# Patient Record
Sex: MALE | Race: WHITE | NOT HISPANIC OR LATINO
[De-identification: names, ages, dates, MRNs, and addresses within clinical notes are randomized per-mention and may not be internally consistent; named-entity substitution may affect disease eponyms.]

---

## 2019-10-29 ENCOUNTER — RESULT REVIEW (OUTPATIENT)
Age: 59
End: 2019-10-29

## 2019-11-05 ENCOUNTER — RESULT REVIEW (OUTPATIENT)
Age: 59
End: 2019-11-05

## 2019-11-12 ENCOUNTER — RESULT REVIEW (OUTPATIENT)
Age: 59
End: 2019-11-12

## 2020-12-10 ENCOUNTER — APPOINTMENT (OUTPATIENT)
Dept: CARDIOLOGY | Facility: CLINIC | Age: 60
End: 2020-12-10
Payer: COMMERCIAL

## 2020-12-10 ENCOUNTER — APPOINTMENT (OUTPATIENT)
Dept: CARDIOLOGY | Facility: CLINIC | Age: 60
End: 2020-12-10

## 2020-12-10 VITALS
SYSTOLIC BLOOD PRESSURE: 130 MMHG | TEMPERATURE: 97.7 F | DIASTOLIC BLOOD PRESSURE: 80 MMHG | HEART RATE: 99 BPM | BODY MASS INDEX: 35 KG/M2 | HEIGHT: 71 IN | WEIGHT: 250 LBS | OXYGEN SATURATION: 97 %

## 2020-12-10 PROCEDURE — 93000 ELECTROCARDIOGRAM COMPLETE: CPT

## 2020-12-10 PROCEDURE — 93306 TTE W/DOPPLER COMPLETE: CPT

## 2020-12-10 PROCEDURE — 99245 OFF/OP CONSLTJ NEW/EST HI 55: CPT

## 2020-12-10 PROCEDURE — 99072 ADDL SUPL MATRL&STAF TM PHE: CPT

## 2020-12-10 RX ORDER — SAXAGLIPTIN 5 MG/1
5 TABLET, FILM COATED ORAL DAILY
Refills: 0 | Status: ACTIVE | COMMUNITY

## 2020-12-10 RX ORDER — GLIMEPIRIDE 2 MG/1
2 TABLET ORAL
Refills: 0 | Status: ACTIVE | COMMUNITY

## 2020-12-10 RX ORDER — ATORVASTATIN CALCIUM 40 MG/1
40 TABLET, FILM COATED ORAL
Refills: 0 | Status: ACTIVE | COMMUNITY

## 2020-12-10 RX ORDER — CLOPIDOGREL 75 MG/1
75 TABLET, FILM COATED ORAL DAILY
Refills: 0 | Status: ACTIVE | COMMUNITY

## 2020-12-10 NOTE — REASON FOR VISIT
[Initial Evaluation] : an initial evaluation of [Cardiomyopathy] : cardiomyopathy [Coronary Artery Disease] : coronary artery disease [Hyperlipidemia] : hyperlipidemia [Hypertension] : hypertension [FreeTextEntry1] : 59-year-old white male here to reestablish cardiology follow-up after a very long hiatus.\par \par Known history of atherosclerotic heart disease, status post myocardial infarction in 2006 with ischemic cardiomyopathy, 2 stents placed to RCA and ICD placed.  Patient also has known hypertension and hyperlipidemia, mild obesity, sedentary lifestyle.  Patient has not had cardiology evaluation in several years but is followed at the EP center at [facility].\par \par He denies any recent chest pain, shortness of breath, palpitations, edema or syncopal episodes.  His medications have been relatively unchanged all of these years.  His diabetes is managed by his primary care physician and his last known A1c was 6.5, as per patient.  Denies history of renal insufficiency, neuropathy or any other sequelae of diabetes.  Nondrinker non-smoker.  Self-employed.\par \par Scheduled to have colonoscopy soon at [place] with Dr. Boo, he was referred because of EKG abnormalities that appeared new to the primary care physician.

## 2020-12-10 NOTE — PHYSICAL EXAM
[General Appearance - Well Developed] : well developed [Normal Appearance] : normal appearance [Well Groomed] : well groomed [General Appearance - Well Nourished] : well nourished [No Deformities] : no deformities [General Appearance - In No Acute Distress] : no acute distress [Normal Conjunctiva] : the conjunctiva exhibited no abnormalities [Eyelids - No Xanthelasma] : the eyelids demonstrated no xanthelasmas [Normal Oral Mucosa] : normal oral mucosa [No Oral Pallor] : no oral pallor [No Oral Cyanosis] : no oral cyanosis [Normal Jugular Venous A Waves Present] : normal jugular venous A waves present [Normal Jugular Venous V Waves Present] : normal jugular venous V waves present [No Jugular Venous Bella A Waves] : no jugular venous bella A waves [Heart Rate And Rhythm] : heart rate and rhythm were normal [Heart Sounds] : normal S1 and S2 [Murmurs] : no murmurs present [Respiration, Rhythm And Depth] : normal respiratory rhythm and effort [Exaggerated Use Of Accessory Muscles For Inspiration] : no accessory muscle use [Auscultation Breath Sounds / Voice Sounds] : lungs were clear to auscultation bilaterally [Abdomen Soft] : soft [Abdomen Tenderness] : non-tender [Abdomen Mass (___ Cm)] : no abdominal mass palpated [Abnormal Walk] : normal gait [Gait - Sufficient For Exercise Testing] : the gait was sufficient for exercise testing [Nail Clubbing] : no clubbing of the fingernails [Cyanosis, Localized] : no localized cyanosis [Petechial Hemorrhages (___cm)] : no petechial hemorrhages [Skin Color & Pigmentation] : normal skin color and pigmentation [] : no rash [No Venous Stasis] : no venous stasis [Skin Lesions] : no skin lesions [No Skin Ulcers] : no skin ulcer [No Xanthoma] : no  xanthoma was observed [Oriented To Time, Place, And Person] : oriented to person, place, and time [Affect] : the affect was normal [Mood] : the mood was normal [No Anxiety] : not feeling anxious

## 2020-12-10 NOTE — ASSESSMENT
[FreeTextEntry1] : 59-year-old male with scheduled colonoscopy.  Here for cardiac evaluation and clearance.  No recent cardiac evaluation done.  Patient denies any recent chest pain or shortness of breath.  Will obtain echocardiogram to reevaluate left ventricular function.  Would consider changing Diovan HCT to Entresto if EF is reduced and also consider addition of Aldactone.  Requested copies of most recent labs to assess renal function and electrolytes.\par Patient has not had an ischemia evaluation in over a decade; patient scheduled for pharmacological nuclear stress testing.  Final clearance for colonoscopy pending above.

## 2020-12-15 ENCOUNTER — APPOINTMENT (OUTPATIENT)
Dept: CARDIOLOGY | Facility: CLINIC | Age: 60
End: 2020-12-15
Payer: COMMERCIAL

## 2020-12-15 ENCOUNTER — MED ADMIN CHARGE (OUTPATIENT)
Age: 60
End: 2020-12-15

## 2020-12-15 PROCEDURE — 99072 ADDL SUPL MATRL&STAF TM PHE: CPT

## 2020-12-15 PROCEDURE — 78452 HT MUSCLE IMAGE SPECT MULT: CPT

## 2020-12-15 PROCEDURE — A9500: CPT

## 2020-12-15 PROCEDURE — 93015 CV STRESS TEST SUPVJ I&R: CPT

## 2020-12-15 RX ORDER — REGADENOSON 0.08 MG/ML
0.4 INJECTION, SOLUTION INTRAVENOUS
Qty: 1 | Refills: 0 | Status: COMPLETED | OUTPATIENT
Start: 2020-12-15

## 2020-12-15 RX ADMIN — REGADENOSON 4 MG/5ML: 0.08 INJECTION, SOLUTION INTRAVENOUS at 00:00

## 2021-01-06 ENCOUNTER — APPOINTMENT (OUTPATIENT)
Dept: CARDIOLOGY | Facility: CLINIC | Age: 61
End: 2021-01-06
Payer: COMMERCIAL

## 2021-01-06 VITALS
WEIGHT: 256 LBS | TEMPERATURE: 97.7 F | SYSTOLIC BLOOD PRESSURE: 128 MMHG | OXYGEN SATURATION: 98 % | DIASTOLIC BLOOD PRESSURE: 80 MMHG | HEIGHT: 71 IN | BODY MASS INDEX: 35.84 KG/M2 | HEART RATE: 72 BPM

## 2021-01-06 PROCEDURE — 99214 OFFICE O/P EST MOD 30 MIN: CPT

## 2021-01-06 PROCEDURE — 99072 ADDL SUPL MATRL&STAF TM PHE: CPT

## 2021-01-06 PROCEDURE — 93000 ELECTROCARDIOGRAM COMPLETE: CPT

## 2021-01-06 PROCEDURE — 99244 OFF/OP CNSLTJ NEW/EST MOD 40: CPT

## 2021-01-06 RX ORDER — VALSARTAN AND HYDROCHLOROTHIAZIDE 80; 12.5 MG/1; MG/1
80-12.5 TABLET, FILM COATED ORAL
Refills: 0 | Status: DISCONTINUED | COMMUNITY
End: 2021-01-06

## 2021-01-06 NOTE — REASON FOR VISIT
[Initial Evaluation] : an initial evaluation of [Cardiomyopathy] : cardiomyopathy [Coronary Artery Disease] : coronary artery disease [Hyperlipidemia] : hyperlipidemia [Hypertension] : hypertension [FreeTextEntry1] : 60-year-old white male with a known history of atherosclerotic heart disease, status post myocardial infarction in 2006 with ischemic cardiomyopathy, 2 stents placed to RCA and ICD placed.  H/O hypertension and hyperlipidemia, mild obesity, sedentary lifestyle.  Patient is followed at the EP center at [facility].\par \par He denies any recent chest pain, shortness of breath, palpitations, edema or syncopal episodes.  His medications have been relatively unchanged all of these years.  His diabetes is managed by his primary care physician and his last known A1c was 6.5, as per patient.  Denies history of renal insufficiency, neuropathy or any other sequelae of diabetes.  Nondrinker non-smoker.  Self-employed.\par \par Here to review results of recent cardiac testing.\par \par Scheduled to have colonoscopy soon at [place] with Dr. Boo, he was referred because of EKG abnormalities that appeared new to the primary care physician.

## 2021-01-06 NOTE — ASSESSMENT
[FreeTextEntry1] : 59-year-old male for scheduled colonoscopy.  \par No cardiac contraindication to colonoscopy (low risk procedure). Patient on DAPT, may hold Plavix and ASA up to 5 days prior to colonoscopy if requested by gastroenterologist.\par \par Will change Diovan HCT to Entresto and consider addition of Aldactone at next visit. \par \par Lengthy d/w patient regarding his cardiomyopathy (EF was in 30's prior to this). Stress test demonstrates scar tissue, not pursuing further interventions.

## 2021-01-06 NOTE — PHYSICAL EXAM
[General Appearance - Well Developed] : well developed [Normal Appearance] : normal appearance [Well Groomed] : well groomed [General Appearance - Well Nourished] : well nourished [No Deformities] : no deformities [General Appearance - In No Acute Distress] : no acute distress [Normal Conjunctiva] : the conjunctiva exhibited no abnormalities [Eyelids - No Xanthelasma] : the eyelids demonstrated no xanthelasmas [Normal Oral Mucosa] : normal oral mucosa [No Oral Pallor] : no oral pallor [No Oral Cyanosis] : no oral cyanosis [Normal Jugular Venous A Waves Present] : normal jugular venous A waves present [Normal Jugular Venous V Waves Present] : normal jugular venous V waves present [No Jugular Venous Bella A Waves] : no jugular venous bella A waves [Respiration, Rhythm And Depth] : normal respiratory rhythm and effort [Exaggerated Use Of Accessory Muscles For Inspiration] : no accessory muscle use [Auscultation Breath Sounds / Voice Sounds] : lungs were clear to auscultation bilaterally [Heart Rate And Rhythm] : heart rate and rhythm were normal [Heart Sounds] : normal S1 and S2 [Murmurs] : no murmurs present [Abdomen Soft] : soft [Abdomen Tenderness] : non-tender [Abdomen Mass (___ Cm)] : no abdominal mass palpated [Abnormal Walk] : normal gait [Gait - Sufficient For Exercise Testing] : the gait was sufficient for exercise testing [Nail Clubbing] : no clubbing of the fingernails [Cyanosis, Localized] : no localized cyanosis [Petechial Hemorrhages (___cm)] : no petechial hemorrhages [Skin Color & Pigmentation] : normal skin color and pigmentation [] : no rash [No Venous Stasis] : no venous stasis [Skin Lesions] : no skin lesions [No Skin Ulcers] : no skin ulcer [No Xanthoma] : no  xanthoma was observed [Oriented To Time, Place, And Person] : oriented to person, place, and time [Affect] : the affect was normal [Mood] : the mood was normal [No Anxiety] : not feeling anxious

## 2021-02-18 ENCOUNTER — APPOINTMENT (OUTPATIENT)
Dept: CARDIOLOGY | Facility: CLINIC | Age: 61
End: 2021-02-18

## 2021-02-22 ENCOUNTER — APPOINTMENT (OUTPATIENT)
Dept: CARDIOLOGY | Facility: CLINIC | Age: 61
End: 2021-02-22
Payer: COMMERCIAL

## 2021-02-22 VITALS
BODY MASS INDEX: 34.58 KG/M2 | HEART RATE: 92 BPM | DIASTOLIC BLOOD PRESSURE: 70 MMHG | OXYGEN SATURATION: 98 % | TEMPERATURE: 98.6 F | HEIGHT: 71 IN | WEIGHT: 247 LBS | SYSTOLIC BLOOD PRESSURE: 120 MMHG

## 2021-02-22 DIAGNOSIS — Z20.822 CONTACT WITH AND (SUSPECTED) EXPOSURE TO COVID-19: ICD-10-CM

## 2021-02-22 PROCEDURE — 99072 ADDL SUPL MATRL&STAF TM PHE: CPT

## 2021-02-22 PROCEDURE — 99214 OFFICE O/P EST MOD 30 MIN: CPT

## 2021-02-24 NOTE — PHYSICAL EXAM
[General Appearance - Well Developed] : well developed [Normal Appearance] : normal appearance [Well Groomed] : well groomed [General Appearance - Well Nourished] : well nourished [No Deformities] : no deformities [General Appearance - In No Acute Distress] : no acute distress [Normal Conjunctiva] : the conjunctiva exhibited no abnormalities [Eyelids - No Xanthelasma] : the eyelids demonstrated no xanthelasmas [Normal Oral Mucosa] : normal oral mucosa [No Oral Pallor] : no oral pallor [No Oral Cyanosis] : no oral cyanosis [Normal Jugular Venous A Waves Present] : normal jugular venous A waves present [Normal Jugular Venous V Waves Present] : normal jugular venous V waves present [No Jugular Venous Bella A Waves] : no jugular venous bella A waves [Respiration, Rhythm And Depth] : normal respiratory rhythm and effort [Exaggerated Use Of Accessory Muscles For Inspiration] : no accessory muscle use [Auscultation Breath Sounds / Voice Sounds] : lungs were clear to auscultation bilaterally [Heart Rate And Rhythm] : heart rate and rhythm were normal [Heart Sounds] : normal S1 and S2 [Murmurs] : no murmurs present [Abdomen Soft] : soft [Abdomen Tenderness] : non-tender [Abdomen Mass (___ Cm)] : no abdominal mass palpated [Abnormal Walk] : normal gait [Gait - Sufficient For Exercise Testing] : the gait was sufficient for exercise testing [Nail Clubbing] : no clubbing of the fingernails [Cyanosis, Localized] : no localized cyanosis [Petechial Hemorrhages (___cm)] : no petechial hemorrhages [] : no rash [Skin Color & Pigmentation] : normal skin color and pigmentation [No Venous Stasis] : no venous stasis [Skin Lesions] : no skin lesions [No Skin Ulcers] : no skin ulcer [No Xanthoma] : no  xanthoma was observed [Oriented To Time, Place, And Person] : oriented to person, place, and time [Affect] : the affect was normal [Mood] : the mood was normal [No Anxiety] : not feeling anxious

## 2021-02-24 NOTE — DISCUSSION/SUMMARY
[FreeTextEntry1] : 59-year-old male for scheduled colonoscopy.  \par No cardiac contraindication to colonoscopy (low risk procedure). Patient on DAPT, may hold Plavix and ASA up to 5 days prior to colonoscopy if requested by gastroenterologist.\par \par Tolerating Entresto; if labs drawn show no contraindications, will add Aldactone. \par \par Lengthy d/w patient regarding his cardiomyopathy (EF was in 30's prior to this). Stress test demonstrates scar tissue, not pursuing further interventions.

## 2021-02-24 NOTE — REASON FOR VISIT
[Initial Evaluation] : an initial evaluation of [Cardiomyopathy] : cardiomyopathy [Coronary Artery Disease] : coronary artery disease [Hyperlipidemia] : hyperlipidemia [Hypertension] : hypertension [FreeTextEntry1] : 60-year-old white male with a known history of atherosclerotic heart disease, status post myocardial infarction in 2006 with ischemic cardiomyopathy, 2 stents placed to RCA and ICD placed.  H/O hypertension and hyperlipidemia, mild obesity, sedentary lifestyle.  Patient is followed at the EP center at [facility].\par \par He denies any recent chest pain, shortness of breath, palpitations, edema or syncopal episodes.  He appears to be tolerating his Entresto very well.\par \par

## 2021-03-07 DIAGNOSIS — E83.42 HYPOMAGNESEMIA: ICD-10-CM

## 2021-03-07 LAB
25(OH)D3 SERPL-MCNC: 72.7 NG/ML
ALBUMIN SERPL ELPH-MCNC: 4.4 G/DL
ALP BLD-CCNC: 55 U/L
ALT SERPL-CCNC: 24 U/L
ANION GAP SERPL CALC-SCNC: 16 MMOL/L
AST SERPL-CCNC: 28 U/L
BASOPHILS # BLD AUTO: 0.07 K/UL
BASOPHILS NFR BLD AUTO: 0.8 %
BILIRUB SERPL-MCNC: 0.6 MG/DL
BUN SERPL-MCNC: 17 MG/DL
CALCIUM SERPL-MCNC: 9.4 MG/DL
CHLORIDE SERPL-SCNC: 102 MMOL/L
CO2 SERPL-SCNC: 21 MMOL/L
CREAT SERPL-MCNC: 0.83 MG/DL
EOSINOPHIL # BLD AUTO: 0.21 K/UL
EOSINOPHIL NFR BLD AUTO: 2.4 %
GLUCOSE SERPL-MCNC: 197 MG/DL
HCT VFR BLD CALC: 45.1 %
HGB BLD-MCNC: 14.7 G/DL
IMM GRANULOCYTES NFR BLD AUTO: 0.2 %
LYMPHOCYTES # BLD AUTO: 1.98 K/UL
LYMPHOCYTES NFR BLD AUTO: 23.1 %
MAGNESIUM SERPL-MCNC: 1.4 MG/DL
MAN DIFF?: NORMAL
MCHC RBC-ENTMCNC: 30.5 PG
MCHC RBC-ENTMCNC: 32.6 GM/DL
MCV RBC AUTO: 93.6 FL
MONOCYTES # BLD AUTO: 0.67 K/UL
MONOCYTES NFR BLD AUTO: 7.8 %
NEUTROPHILS # BLD AUTO: 5.64 K/UL
NEUTROPHILS NFR BLD AUTO: 65.7 %
PLATELET # BLD AUTO: 247 K/UL
POTASSIUM SERPL-SCNC: 5.1 MMOL/L
PROT SERPL-MCNC: 6.7 G/DL
PSA FREE FLD-MCNC: 4 %
PSA FREE SERPL-MCNC: 0.11 NG/ML
PSA SERPL-MCNC: 2.39 NG/ML
RBC # BLD: 4.82 M/UL
RBC # FLD: 14.3 %
SARS-COV-2 IGG SERPL IA-ACNC: <0.1 INDEX
SARS-COV-2 IGG SERPL QL IA: NEGATIVE
SODIUM SERPL-SCNC: 139 MMOL/L
TSH SERPL-ACNC: 0.79 UIU/ML
WBC # FLD AUTO: 8.59 K/UL

## 2021-03-07 RX ORDER — VITS A,C,E/LUTEIN/MINERALS 300MCG-200
200 TABLET ORAL DAILY
Qty: 90 | Refills: 1 | Status: ACTIVE | COMMUNITY
Start: 2021-03-07 | End: 1900-01-01

## 2021-07-07 ENCOUNTER — APPOINTMENT (OUTPATIENT)
Dept: CARDIOLOGY | Facility: CLINIC | Age: 61
End: 2021-07-07

## 2021-07-15 ENCOUNTER — NON-APPOINTMENT (OUTPATIENT)
Age: 61
End: 2021-07-15

## 2021-07-15 ENCOUNTER — APPOINTMENT (OUTPATIENT)
Dept: CARDIOLOGY | Facility: CLINIC | Age: 61
End: 2021-07-15
Payer: COMMERCIAL

## 2021-07-15 VITALS
OXYGEN SATURATION: 98 % | DIASTOLIC BLOOD PRESSURE: 60 MMHG | SYSTOLIC BLOOD PRESSURE: 104 MMHG | WEIGHT: 248 LBS | HEART RATE: 94 BPM | BODY MASS INDEX: 34.72 KG/M2 | HEIGHT: 71 IN

## 2021-07-15 PROCEDURE — 93000 ELECTROCARDIOGRAM COMPLETE: CPT

## 2021-07-15 PROCEDURE — 99072 ADDL SUPL MATRL&STAF TM PHE: CPT

## 2021-07-15 PROCEDURE — 99214 OFFICE O/P EST MOD 30 MIN: CPT

## 2021-07-15 NOTE — REASON FOR VISIT
[FreeTextEntry1] : 60-year-old white male with a known history of atherosclerotic heart disease, status post myocardial infarction in 2006 with ischemic cardiomyopathy, 2 stents placed to RCA and ICD placed.  H/O hypertension and hyperlipidemia, mild obesity, sedentary lifestyle.  Patient is followed at the EP center at [facility].  Patient states that he was told by EP service at [place] that he is having more frequent salvos of nonsustained ventricular tachycardia approximately once monthly.  No changes made to his medical regimen.\par \par He denies any recent chest pain, shortness of breath, palpitations, edema or syncopal episodes.  He appears to be tolerating his Entresto very well.  No dizziness or weakness noted.\par \par  [Initial Evaluation] : an initial evaluation of [Cardiomyopathy] : cardiomyopathy [Coronary Artery Disease] : coronary artery disease [Hyperlipidemia] : hyperlipidemia [Hypertension] : hypertension

## 2021-07-15 NOTE — CARDIOLOGY SUMMARY
[de-identified] : 7/15/2021, Sinus Rhythm \par Low voltage in precordial leads. \par -Nonspecific QRS widening. \par -Inferior infarct -age undetermined -Old anterior infarct. \par -Nonspecific ST depression + Extensive T-abnormality. [No Ischemia] : no Ischemia [Fixed Defect] : fixed defect [___] : [unfilled] [LVEF ___%] : LVEF [unfilled]%

## 2021-07-15 NOTE — REASON FOR VISIT
[FreeTextEntry1] : 60-year-old white male with a known history of atherosclerotic heart disease, status post myocardial infarction in 2006 with ischemic cardiomyopathy, 2 stents placed to RCA and ICD placed.  H/O hypertension and hyperlipidemia, mild obesity, sedentary lifestyle.  Patient is followed at the EP center at [facility].  Patient states that he was told by EP service at [place] that he is having more frequent salvos of nonsustained ventricular tachycardia approximately once monthly.  No changes made to his medical regimen.\par \par He denies any recent chest pain, shortness of breath, palpitations, edema or syncopal episodes.  He appears to be tolerating his Entresto very well.  No dizziness or weakness noted.\par \par  [Initial Evaluation] : an initial evaluation of [Cardiomyopathy] : cardiomyopathy [Coronary Artery Disease] : coronary artery disease [Hyperlipidemia] : hyperlipidemia [Hypertension] : hypertension

## 2021-07-15 NOTE — DISCUSSION/SUMMARY
[___ Month(s)] : in [unfilled] month(s) [FreeTextEntry1] : 59-year-old male f with ischemic cardiomyopathy.  Evidence of increasing dysrhythmia noted on ICD interrogations.  Low blood pressure, reluctant to increase beta-blocker at this time.  May require amiodarone if ICD triggers because of ventricular tachyarrhythmias.\par \par Tolerating Entresto; if labs drawn show no contraindications, will hold off adding Aldactone due to low blood\par \par

## 2021-07-15 NOTE — CARDIOLOGY SUMMARY
[de-identified] : 7/15/2021, Sinus Rhythm \par Low voltage in precordial leads. \par -Nonspecific QRS widening. \par -Inferior infarct -age undetermined -Old anterior infarct. \par -Nonspecific ST depression + Extensive T-abnormality. [No Ischemia] : no Ischemia [Fixed Defect] : fixed defect [___] : [unfilled] [LVEF ___%] : LVEF [unfilled]%

## 2021-07-20 LAB
ALBUMIN SERPL ELPH-MCNC: 4.6 G/DL
ALP BLD-CCNC: 60 U/L
ALT SERPL-CCNC: 22 U/L
ANION GAP SERPL CALC-SCNC: 16 MMOL/L
AST SERPL-CCNC: 26 U/L
BASOPHILS # BLD AUTO: 0.09 K/UL
BASOPHILS NFR BLD AUTO: 0.8 %
BILIRUB SERPL-MCNC: 0.6 MG/DL
BUN SERPL-MCNC: 17 MG/DL
CALCIUM SERPL-MCNC: 9.6 MG/DL
CHLORIDE SERPL-SCNC: 104 MMOL/L
CHOLEST SERPL-MCNC: 110 MG/DL
CO2 SERPL-SCNC: 23 MMOL/L
CREAT SERPL-MCNC: 0.79 MG/DL
EOSINOPHIL # BLD AUTO: 0.24 K/UL
EOSINOPHIL NFR BLD AUTO: 2.2 %
ESTIMATED AVERAGE GLUCOSE: 137 MG/DL
GLUCOSE SERPL-MCNC: 142 MG/DL
HBA1C MFR BLD HPLC: 6.4 %
HCT VFR BLD CALC: 41.4 %
HDLC SERPL-MCNC: 35 MG/DL
HGB BLD-MCNC: 13.6 G/DL
IMM GRANULOCYTES NFR BLD AUTO: 0.3 %
LDLC SERPL CALC-MCNC: 43 MG/DL
LYMPHOCYTES # BLD AUTO: 2.46 K/UL
LYMPHOCYTES NFR BLD AUTO: 22.4 %
MAN DIFF?: NORMAL
MCHC RBC-ENTMCNC: 30.8 PG
MCHC RBC-ENTMCNC: 32.9 GM/DL
MCV RBC AUTO: 93.9 FL
MONOCYTES # BLD AUTO: 0.96 K/UL
MONOCYTES NFR BLD AUTO: 8.7 %
NEUTROPHILS # BLD AUTO: 7.21 K/UL
NEUTROPHILS NFR BLD AUTO: 65.6 %
NONHDLC SERPL-MCNC: 75 MG/DL
PLATELET # BLD AUTO: 273 K/UL
POTASSIUM SERPL-SCNC: 4.8 MMOL/L
PROT SERPL-MCNC: 6.8 G/DL
RBC # BLD: 4.41 M/UL
RBC # FLD: 14.3 %
SODIUM SERPL-SCNC: 142 MMOL/L
TRIGL SERPL-MCNC: 158 MG/DL
TSH SERPL-ACNC: 1.23 UIU/ML
WBC # FLD AUTO: 10.99 K/UL

## 2021-11-10 ENCOUNTER — NON-APPOINTMENT (OUTPATIENT)
Age: 61
End: 2021-11-10

## 2021-11-10 ENCOUNTER — APPOINTMENT (OUTPATIENT)
Dept: CARDIOLOGY | Facility: CLINIC | Age: 61
End: 2021-11-10
Payer: COMMERCIAL

## 2021-11-10 VITALS
HEART RATE: 74 BPM | WEIGHT: 250 LBS | OXYGEN SATURATION: 97 % | SYSTOLIC BLOOD PRESSURE: 128 MMHG | DIASTOLIC BLOOD PRESSURE: 64 MMHG | BODY MASS INDEX: 35 KG/M2 | HEIGHT: 71 IN

## 2021-11-10 PROCEDURE — 93000 ELECTROCARDIOGRAM COMPLETE: CPT

## 2021-11-10 PROCEDURE — 99213 OFFICE O/P EST LOW 20 MIN: CPT

## 2021-11-10 NOTE — REASON FOR VISIT
[Structural Heart and Valve Disease] : structural heart and valve disease [FreeTextEntry1] : 60-year-old white male with a known history of atherosclerotic heart disease, status post myocardial infarction in 2006 with ischemic cardiomyopathy, 2 stents placed to RCA and ICD placed.  H/O hypertension and hyperlipidemia, mild obesity, sedentary lifestyle.  Patient is followed at the EP center at [facility].  \par \par He denies any recent chest pain, shortness of breath, palpitations, edema or syncopal episodes.  He appears to be tolerating his Entresto very well.  No dizziness or weakness noted. \par \par Had another episode of ATP in September, as per patient\par \par

## 2021-11-10 NOTE — CARDIOLOGY SUMMARY
[No Ischemia] : no Ischemia [Fixed Defect] : fixed defect [___] : [unfilled] [LVEF ___%] : LVEF [unfilled]% [de-identified] : 11/10/2021, sinus Rhythm \par Low voltage in precordial leads. \par -Nonspecific QRS widening. \par -Inferior infarct -age undetermined -Old anterior infarct. \par -Nonspecific ST depression + Extensive T-abnormality.

## 2021-12-20 ENCOUNTER — RX CHANGE (OUTPATIENT)
Age: 61
End: 2021-12-20

## 2021-12-29 ENCOUNTER — RX RENEWAL (OUTPATIENT)
Age: 61
End: 2021-12-29

## 2022-01-01 ENCOUNTER — APPOINTMENT (OUTPATIENT)
Dept: CARDIOLOGY | Facility: CLINIC | Age: 62
End: 2022-01-01

## 2022-01-01 ENCOUNTER — RX CHANGE (OUTPATIENT)
Age: 62
End: 2022-01-01

## 2022-01-01 ENCOUNTER — NON-APPOINTMENT (OUTPATIENT)
Age: 62
End: 2022-01-01

## 2022-01-01 ENCOUNTER — RX RENEWAL (OUTPATIENT)
Age: 62
End: 2022-01-01

## 2022-01-01 VITALS
HEART RATE: 79 BPM | SYSTOLIC BLOOD PRESSURE: 104 MMHG | BODY MASS INDEX: 36.4 KG/M2 | TEMPERATURE: 97.8 F | HEIGHT: 71 IN | DIASTOLIC BLOOD PRESSURE: 60 MMHG | OXYGEN SATURATION: 98 % | WEIGHT: 260 LBS

## 2022-01-01 DIAGNOSIS — E11.9 TYPE 2 DIABETES MELLITUS W/OUT COMPLICATIONS: ICD-10-CM

## 2022-01-01 PROCEDURE — 99214 OFFICE O/P EST MOD 30 MIN: CPT | Mod: 25

## 2022-01-01 PROCEDURE — 93000 ELECTROCARDIOGRAM COMPLETE: CPT

## 2022-01-01 RX ORDER — DAPAGLIFLOZIN 10 MG/1
10 TABLET, FILM COATED ORAL DAILY
Qty: 90 | Refills: 3 | Status: ACTIVE | COMMUNITY
Start: 2022-01-01 | End: 1900-01-01

## 2022-01-01 RX ORDER — BLOOD-GLUCOSE METER
W/DEVICE KIT MISCELLANEOUS
Qty: 1 | Refills: 0 | Status: ACTIVE | COMMUNITY
Start: 2022-01-01 | End: 1900-01-01

## 2022-01-01 RX ORDER — SACUBITRIL AND VALSARTAN 49; 51 MG/1; MG/1
49-51 TABLET, FILM COATED ORAL
Qty: 60 | Refills: 8 | Status: ACTIVE | COMMUNITY
Start: 2021-01-06 | End: 1900-01-01

## 2022-01-01 RX ORDER — METFORMIN HYDROCHLORIDE 500 MG/1
500 TABLET, COATED ORAL TWICE DAILY
Qty: 180 | Refills: 0 | Status: ACTIVE | COMMUNITY
Start: 1900-01-01 | End: 1900-01-01

## 2022-02-16 ENCOUNTER — NON-APPOINTMENT (OUTPATIENT)
Age: 62
End: 2022-02-16

## 2022-02-16 ENCOUNTER — APPOINTMENT (OUTPATIENT)
Dept: CARDIOLOGY | Facility: CLINIC | Age: 62
End: 2022-02-16
Payer: COMMERCIAL

## 2022-02-16 VITALS
HEIGHT: 71 IN | BODY MASS INDEX: 36.12 KG/M2 | WEIGHT: 258 LBS | DIASTOLIC BLOOD PRESSURE: 60 MMHG | SYSTOLIC BLOOD PRESSURE: 122 MMHG | OXYGEN SATURATION: 98 % | HEART RATE: 88 BPM

## 2022-02-16 PROCEDURE — 93000 ELECTROCARDIOGRAM COMPLETE: CPT

## 2022-02-16 PROCEDURE — 99213 OFFICE O/P EST LOW 20 MIN: CPT

## 2022-04-12 ENCOUNTER — NON-APPOINTMENT (OUTPATIENT)
Age: 62
End: 2022-04-12

## 2022-04-12 NOTE — REASON FOR VISIT
[Structural Heart and Valve Disease] : structural heart and valve disease [FreeTextEntry1] : 61-year-old white male with a known history of atherosclerotic heart disease, status post myocardial infarction in 2006 with ischemic cardiomyopathy, 2 stents placed to RCA and ICD placed.  H/O hypertension and hyperlipidemia, mild obesity, sedentary lifestyle.  Patient is followed at the EP center at [facility].  \par \par He denies any recent chest pain, shortness of breath, palpitations, edema or syncopal episodes.  He appears to be tolerating his Entresto very well.  No dizziness or weakness noted. Weight gain noted.\par

## 2022-04-12 NOTE — CARDIOLOGY SUMMARY
[de-identified] : 2/16/22, Sinus  Rhythm  -Frequent pvcs -ventricular trigeminy , Low voltage in precordial leads. \par  -Nonspecific QRS widening.  -Old anterior infarct.  -Nonspecific ST depression   +   T-abnormality  -Nondiagnostic  -Possible  Anterolateral  ischemia. \par 11/10/2021, sinus Rhythm, Low voltage in precordial leads. -Nonspecific QRS widening. -Inferior infarct -age undetermined -Old anterior infarct. -Nonspecific ST depression + Extensive T-abnormality. [No Ischemia] : no Ischemia [Fixed Defect] : fixed defect [___] : [unfilled] [LVEF ___%] : LVEF [unfilled]% [___] : [unfilled]

## 2022-04-12 NOTE — DISCUSSION/SUMMARY
[___ Week(s)] : in [unfilled] week(s) [FreeTextEntry1] : 60 year-old male with history of ischemic cardiomyopathy.  Evidence of increasing dysrhythmia noted on ICD interrogations and on ECG.  Tolerating low dose Entresto; BP's adequate, increasing Carvedilol for PVC suppression and GDMT , continue to monitor BP's and f/u in 6-8 weeks for reassessment.\par Labs reviewed with patient.

## 2022-04-18 ENCOUNTER — APPOINTMENT (OUTPATIENT)
Dept: CARDIOLOGY | Facility: CLINIC | Age: 62
End: 2022-04-18
Payer: COMMERCIAL

## 2022-04-18 PROCEDURE — 93306 TTE W/DOPPLER COMPLETE: CPT

## 2022-04-21 ENCOUNTER — APPOINTMENT (OUTPATIENT)
Dept: CARDIOLOGY | Facility: CLINIC | Age: 62
End: 2022-04-21
Payer: COMMERCIAL

## 2022-04-21 VITALS
BODY MASS INDEX: 35.28 KG/M2 | DIASTOLIC BLOOD PRESSURE: 64 MMHG | SYSTOLIC BLOOD PRESSURE: 114 MMHG | HEIGHT: 71 IN | OXYGEN SATURATION: 97 % | WEIGHT: 252 LBS | HEART RATE: 67 BPM

## 2022-04-21 PROCEDURE — 99213 OFFICE O/P EST LOW 20 MIN: CPT

## 2022-04-21 PROCEDURE — 93000 ELECTROCARDIOGRAM COMPLETE: CPT

## 2022-04-21 RX ORDER — ASPIRIN 81 MG
81 TABLET, DELAYED RELEASE (ENTERIC COATED) ORAL DAILY
Refills: 0 | Status: ACTIVE | COMMUNITY

## 2022-04-21 NOTE — REASON FOR VISIT
[Structural Heart and Valve Disease] : structural heart and valve disease [FreeTextEntry1] : 61-year-old white male with a known history of atherosclerotic heart disease, status post myocardial infarction in 2006 with ischemic cardiomyopathy, 2 stents placed to RCA and ICD placed.  H/O hypertension and hyperlipidemia, mild obesity, sedentary lifestyle.  Patient is followed at the EP center at [facility].  \par \par He denies any recent chest pain, shortness of breath, palpitations, edema or syncopal episodes.  He appears to be tolerating his Entresto very well.  No dizziness or weakness noted. \par

## 2022-04-21 NOTE — CARDIOLOGY SUMMARY
[de-identified] : 2/16/22, Sinus  Rhythm  -Frequent pvcs -ventricular trigeminy , Low voltage in precordial leads. \par  -Nonspecific QRS widening.  -Old anterior infarct.  -Nonspecific ST depression   +   T-abnormality  -Nondiagnostic  -Possible  /Anterolateral  ischemia. \par 11/10/2021, sinus Rhythm, Low voltage in precordial leads. -Nonspecific QRS widening. -Inferior infarct -age undetermined -Old anterior infarct. -Nonspecific ST depression + Extensive T-abnormality. [No Ischemia] : no Ischemia [Fixed Defect] : fixed defect [___] : [unfilled] [LVEF ___%] : LVEF [unfilled]%

## 2022-04-21 NOTE — DISCUSSION/SUMMARY
[___ Week(s)] : in [unfilled] week(s) [FreeTextEntry1] : 61 year-old male with history of ischemic cardiomyopathy.  Evidence of increasing dysrhythmia noted on ICD interrogations and on ECG.  Tolerating low dose Entresto; BP's adequate, increasing Carvedilol for PVC suppression and GDMT up to 6.25 mg BID , continue to monitor BP's and f/u in 3-4 months for reassessment.\par Strongly consider changing his oral hypoglycemics into SGLT2 inhibitor such as farxiga for cardiac benefits. Told to f/u with PCP who manages his diabetes.\par Echo results reviewed with patient.

## 2022-09-09 PROBLEM — E11.9 CONTROLLED TYPE 2 DIABETES MELLITUS WITHOUT COMPLICATION, WITHOUT LONG-TERM CURRENT USE OF INSULIN: Status: ACTIVE | Noted: 2020-12-10

## 2022-09-09 NOTE — REASON FOR VISIT
[Structural Heart and Valve Disease] : structural heart and valve disease [FreeTextEntry1] : 61-year-old white male with a known history of atherosclerotic heart disease, status post myocardial infarction in 2006 with ischemic cardiomyopathy, 2 stents placed to RCA and ICD placed.  H/O hypertension and hyperlipidemia, mild obesity, sedentary lifestyle.  Patient is followed at the EP center at [facility].  \par \par He denies any recent chest pain, shortness of breath, palpitations, edema or syncopal episodes.  He appears to be tolerating his Entresto very well.  No dizziness or weakness noted. \par

## 2022-09-09 NOTE — DISCUSSION/SUMMARY
[___ Week(s)] : in [unfilled] week(s) [FreeTextEntry1] : 61 year-old male with history of ischemic cardiomyopathy. Evidence of increasing dysrhythmia noted on ICD interrogations and on ECG.  Tolerating low dose Entresto; BP's adequate, on Carvedilol for PVC suppression and GDMT up to 6.25 mg BID , further titration limited by low blood pressures.\par Added Farxiga for cardiac benefits. Changed Metformin to 500 mg BId, further adjustment of doses based on A1c, should be re-evaluated in 3 months. Labs requested. [EKG obtained to assist in diagnosis and management of assessed problem(s)] : EKG obtained to assist in diagnosis and management of assessed problem(s)

## 2022-09-09 NOTE — CARDIOLOGY SUMMARY
[de-identified] : 9/8/22, Sinus  Rhythm  - frequent ectopic ventricular beats, -Left bundle branch block. \par  -Inferior infarct -age undetermined. \par 2/16/22, Sinus  Rhythm  -Frequent pvcs -ventricular trigeminy , Low voltage in precordial leads. \par  -Nonspecific QRS widening.  -Old anterior infarct.  -Nonspecific ST depression   +   T-abnormality  -Nondiagnostic  -Possible  /Anterolateral  ischemia. \par 11/10/2021, sinus Rhythm, Low voltage in precordial leads. -Nonspecific QRS widening. -Inferior infarct -age undetermined -Old anterior infarct. -Nonspecific ST depression + Extensive T-abnormality. [No Ischemia] : no Ischemia [Fixed Defect] : fixed defect [___] : [unfilled] [LVEF ___%] : LVEF [unfilled]% [___] : [unfilled]

## 2023-01-01 ENCOUNTER — RX RENEWAL (OUTPATIENT)
Age: 63
End: 2023-01-01

## 2023-01-01 ENCOUNTER — NON-APPOINTMENT (OUTPATIENT)
Age: 63
End: 2023-01-01

## 2023-01-01 ENCOUNTER — APPOINTMENT (OUTPATIENT)
Dept: CARDIOLOGY | Facility: CLINIC | Age: 63
End: 2023-01-01

## 2023-01-01 ENCOUNTER — EMERGENCY (EMERGENCY)
Facility: HOSPITAL | Age: 63
LOS: 0 days | End: 2023-06-01
Attending: EMERGENCY MEDICINE
Payer: COMMERCIAL

## 2023-01-01 ENCOUNTER — APPOINTMENT (OUTPATIENT)
Dept: CARDIOLOGY | Facility: CLINIC | Age: 63
End: 2023-01-01
Payer: COMMERCIAL

## 2023-01-01 VITALS
DIASTOLIC BLOOD PRESSURE: 60 MMHG | HEART RATE: 93 BPM | SYSTOLIC BLOOD PRESSURE: 110 MMHG | HEIGHT: 71 IN | OXYGEN SATURATION: 97 % | BODY MASS INDEX: 33.32 KG/M2 | WEIGHT: 238 LBS

## 2023-01-01 VITALS
HEIGHT: 71 IN | HEART RATE: 82 BPM | BODY MASS INDEX: 35.28 KG/M2 | TEMPERATURE: 97.9 F | OXYGEN SATURATION: 97 % | WEIGHT: 252 LBS | SYSTOLIC BLOOD PRESSURE: 110 MMHG | DIASTOLIC BLOOD PRESSURE: 70 MMHG

## 2023-01-01 VITALS — TEMPERATURE: 105 F

## 2023-01-01 VITALS — WEIGHT: 220.02 LBS

## 2023-01-01 DIAGNOSIS — E11.9 TYPE 2 DIABETES MELLITUS WITHOUT COMPLICATIONS: ICD-10-CM

## 2023-01-01 DIAGNOSIS — I25.5 ISCHEMIC CARDIOMYOPATHY: ICD-10-CM

## 2023-01-01 DIAGNOSIS — E78.5 HYPERLIPIDEMIA, UNSPECIFIED: ICD-10-CM

## 2023-01-01 DIAGNOSIS — I25.10 ATHEROSCLEROTIC HEART DISEASE OF NATIVE CORONARY ARTERY W/OUT ANGINA PECTORIS: ICD-10-CM
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
## 2023-01-01 DIAGNOSIS — A41.9 SEPSIS, UNSPECIFIED ORGANISM: ICD-10-CM
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
## 2023-01-01 DIAGNOSIS — I10 ESSENTIAL (PRIMARY) HYPERTENSION: ICD-10-CM

## 2023-01-01 DIAGNOSIS — I46.9 CARDIAC ARREST, CAUSE UNSPECIFIED: ICD-10-CM

## 2023-01-01 DIAGNOSIS — R53.1 WEAKNESS: ICD-10-CM

## 2023-01-01 DIAGNOSIS — I65.23 OCCLUSION AND STENOSIS OF BILATERAL CAROTID ARTERIES: ICD-10-CM

## 2023-01-01 LAB
25(OH)D3 SERPL-MCNC: 84.7 NG/ML
ALBUMIN SERPL ELPH-MCNC: 4.9 G/DL
ALP BLD-CCNC: 68 U/L
ALT SERPL-CCNC: 27 U/L
ANION GAP SERPL CALC-SCNC: 15 MMOL/L
AST SERPL-CCNC: 25 U/L
BASOPHILS # BLD AUTO: 0.07 K/UL
BASOPHILS NFR BLD AUTO: 0.7 %
BILIRUB SERPL-MCNC: 0.6 MG/DL
BUN SERPL-MCNC: 21 MG/DL
CALCIUM SERPL-MCNC: 10.1 MG/DL
CHLORIDE SERPL-SCNC: 103 MMOL/L
CHOLEST SERPL-MCNC: 127 MG/DL
CO2 SERPL-SCNC: 22 MMOL/L
CREAT SERPL-MCNC: 0.86 MG/DL
CRP SERPL HS-MCNC: 0.67 MG/L
EGFR: 97 ML/MIN/1.73M2
EOSINOPHIL # BLD AUTO: 0.15 K/UL
EOSINOPHIL NFR BLD AUTO: 1.5 %
ESTIMATED AVERAGE GLUCOSE: 148 MG/DL
GLUCOSE SERPL-MCNC: 138 MG/DL
HBA1C MFR BLD HPLC: 6.8 %
HCT VFR BLD CALC: 44 %
HDLC SERPL-MCNC: 36 MG/DL
HGB BLD-MCNC: 14.5 G/DL
IMM GRANULOCYTES NFR BLD AUTO: 0.3 %
LDLC SERPL CALC-MCNC: 63 MG/DL
LYMPHOCYTES # BLD AUTO: 2.35 K/UL
LYMPHOCYTES NFR BLD AUTO: 23.3 %
MAGNESIUM SERPL-MCNC: 1.8 MG/DL
MAN DIFF?: NORMAL
MCHC RBC-ENTMCNC: 30.2 PG
MCHC RBC-ENTMCNC: 33 GM/DL
MCV RBC AUTO: 91.7 FL
MONOCYTES # BLD AUTO: 0.8 K/UL
MONOCYTES NFR BLD AUTO: 7.9 %
NEUTROPHILS # BLD AUTO: 6.67 K/UL
NEUTROPHILS NFR BLD AUTO: 66.3 %
NONHDLC SERPL-MCNC: 91 MG/DL
PLATELET # BLD AUTO: 292 K/UL
POTASSIUM SERPL-SCNC: 4.8 MMOL/L
PROT SERPL-MCNC: 7.3 G/DL
RBC # BLD: 4.8 M/UL
RBC # FLD: 14.6 %
SODIUM SERPL-SCNC: 140 MMOL/L
TRIGL SERPL-MCNC: 141 MG/DL
TSH SERPL-ACNC: 1.18 UIU/ML
WBC # FLD AUTO: 10.07 K/UL

## 2023-01-01 PROCEDURE — 92950 HEART/LUNG RESUSCITATION CPR: CPT

## 2023-01-01 PROCEDURE — 99214 OFFICE O/P EST MOD 30 MIN: CPT | Mod: 25

## 2023-01-01 PROCEDURE — 93000 ELECTROCARDIOGRAM COMPLETE: CPT

## 2023-01-01 PROCEDURE — 99285 EMERGENCY DEPT VISIT HI MDM: CPT | Mod: 25

## 2023-01-01 RX ORDER — CARVEDILOL 6.25 MG/1
6.25 TABLET, FILM COATED ORAL
Qty: 60 | Refills: 8 | Status: ACTIVE | COMMUNITY
Start: 2023-01-01 | End: 1900-01-01

## 2023-01-05 PROBLEM — I25.10 ATHEROSCLEROSIS OF NATIVE CORONARY ARTERY WITHOUT ANGINA PECTORIS: Status: ACTIVE | Noted: 2020-12-10

## 2023-01-05 PROBLEM — E78.5 HYPERLIPIDEMIA, MILD: Status: ACTIVE | Noted: 2020-12-10

## 2023-03-01 NOTE — CARDIOLOGY SUMMARY
[No Ischemia] : no Ischemia [Fixed Defect] : fixed defect [___] : [unfilled] [LVEF ___%] : LVEF [unfilled]% [de-identified] : 1/5/23, Sinus Rhythm \par Low voltage in precordial leads. \par -Intraventricular conduction defect -consider left ventricular hypertrophy. \par -Inferior infarct -age undetermined -Old anterior infarct. \par -Nonspecific ST depression + Negative T-waves.\par 9/8/22, Sinus  Rhythm  - frequent ectopic ventricular beats, -Left bundle branch block. \par  -Inferior infarct -age undetermined. \par 2/16/22, Sinus  Rhythm  -Frequent pvcs -ventricular trigeminy , Low voltage in precordial leads. \par  -Nonspecific QRS widening.  -Old anterior infarct.  -Nonspecific ST depression   +   T-abnormality  -Nondiagnostic  -Possible  /Anterolateral  ischemia. \par 11/10/2021, sinus Rhythm, Low voltage in precordial leads. -Nonspecific QRS widening. -Inferior infarct -age undetermined -Old anterior infarct. -Nonspecific ST depression + Extensive T-abnormality.

## 2023-03-01 NOTE — REASON FOR VISIT
[Structural Heart and Valve Disease] : structural heart and valve disease [FreeTextEntry1] : 62-year-old white male with a known history of atherosclerotic heart disease, status post myocardial infarction in 2006 with ischemic cardiomyopathy, 2 stents placed to RCA and ICD placed.  H/O hypertension and hyperlipidemia, mild obesity, sedentary lifestyle.  Patient is followed at the EP center at [facility].  \par \par He denies any recent chest pain, shortness of breath, palpitations, edema or syncopal episodes.  He appears to be tolerating his Entresto very well.  No dizziness or weakness noted. \par

## 2023-03-01 NOTE — DISCUSSION/SUMMARY
[___ Week(s)] : in [unfilled] week(s) [FreeTextEntry1] : 61 year-old male with history of ischemic cardiomyopathy. Evidence of increasing dysrhythmia noted on ICD interrogations and on ECG.  Tolerating low dose Entresto; BP's adequate, on Carvedilol for PVC suppression and GDMT up to 6.25 mg BID , further titration limited by low blood pressures.\par Referred to [facility] for healt maintenance, has not yet gone.\par Added Farxiga for cardiac benefits. Changed Metformin to 500 mg BId, further adjustment of doses based on A1c, should be re-evaluated in 3 months. Labs requested. [EKG obtained to assist in diagnosis and management of assessed problem(s)] : EKG obtained to assist in diagnosis and management of assessed problem(s)

## 2023-04-13 PROBLEM — I10 BENIGN HYPERTENSION: Status: ACTIVE | Noted: 2020-12-10

## 2023-04-13 PROBLEM — I25.5 ISCHEMIC CARDIOMYOPATHY: Status: ACTIVE | Noted: 2020-12-10

## 2023-04-13 PROBLEM — I65.23 BILATERAL CAROTID ARTERY STENOSIS: Status: ACTIVE | Noted: 2023-01-01

## 2023-04-13 NOTE — DISCUSSION/SUMMARY
[___ Week(s)] : in [unfilled] week(s) [FreeTextEntry1] : 62 year-old male with history of ischemic cardiomyopathy. Evidence of increasing dysrhythmia noted on ICD interrogations and on ECG.  Tolerating  Entresto; BP's adequate, on Carvedilol for PVC suppression and GDMT up to 6.25 mg BID , further titration limited by low blood pressures.\par Referred to [facility] for health maintenance, has not yet gone.\par Added Farxiga for cardiac benefits. f/u labs at PCP.\par Scheduled surveillance echo and carotid. [EKG obtained to assist in diagnosis and management of assessed problem(s)] : EKG obtained to assist in diagnosis and management of assessed problem(s)

## 2023-04-13 NOTE — CARDIOLOGY SUMMARY
[de-identified] : 4/13/23,Sinus  Rhythm , -Left bundle branch block. \par 1/5/23, Sinus Rhythm Low voltage in precordial leads. -Intraventricular conduction defect -consider left ventricular hypertrophy. \par -Inferior infarct -age undetermined -Old anterior infarct. -Nonspecific ST depression + Negative T-waves.\par 9/8/22, Sinus  Rhythm  - frequent ectopic ventricular beats, -Left bundle branch block. \par  -Inferior infarct -age undetermined. \par 2/16/22, Sinus  Rhythm  -Frequent pvcs -ventricular trigeminy , Low voltage in precordial leads. \par  -Nonspecific QRS widening.  -Old anterior infarct.  -Nonspecific ST depression   +   T-abnormality  -Nondiagnostic  -Possible  /Anterolateral  ischemia. \par 11/10/2021, sinus Rhythm, Low voltage in precordial leads. -Nonspecific QRS widening. -Inferior infarct -age undetermined -Old anterior infarct. -Nonspecific ST depression + Extensive T-abnormality. [No Ischemia] : no Ischemia [Fixed Defect] : fixed defect [___] : [unfilled] [LVEF ___%] : LVEF [unfilled]%

## 2023-04-13 NOTE — REASON FOR VISIT
[Structural Heart and Valve Disease] : structural heart and valve disease [FreeTextEntry1] : 62-year-old white male with a known history of atherosclerotic heart disease, status post myocardial infarction in 2006 with ischemic cardiomyopathy, 2 stents placed to RCA and ICD placed.  H/O hypertension and hyperlipidemia, mild obesity, sedentary lifestyle.  Patient is followed at the EP center at [facility].  \par \par He denies any recent chest pain, shortness of breath, palpitations, edema or syncopal episodes.  He appears to be tolerating his Entresto and Farxiga very well. Very pleased with weight loss since starting Farxiga. No dizziness or weakness noted. \par

## 2023-06-01 NOTE — ED ADULT NURSE NOTE - NSFALLUNIVINTERV_ED_ALL_ED
Bed/Stretcher in lowest position, wheels locked, appropriate side rails in place/Call bell, personal items and telephone in reach/Instruct patient to call for assistance before getting out of bed/chair/stretcher/Non-slip footwear applied when patient is off stretcher/[place] to call system/Physically safe environment - no spills, clutter or unnecessary equipment/Purposeful proactive rounding/Room/bathroom lighting operational, light cord in reach

## 2023-06-01 NOTE — ED PROVIDER NOTE - CARE PLAN
Principal Discharge DX:	Cardiac arrest   1 Principal Discharge DX:	Cardiac arrest  Secondary Diagnosis:	Sepsis

## 2023-06-01 NOTE — ED PROVIDER NOTE - EXPIRE DEATH NOTIFICATION
Medical Examiner notified
Breastfeeding Mother’s Support Group Information/Guide to Postpartum Care/Vaccinations/[facility]  Screening Program/Breastfeeding Log/Back To Sleep Handout/Breastfeeding Guide and Packet/  Immunization Record/Birth Certificate Instructions/Shaken Baby Prevention Handout

## 2023-06-01 NOTE — ED PROVIDER NOTE - PHYSICAL EXAMINATION
Gen: unresponsive, pale, no spontaneous movement or speech  Head: pupils fixed/dilated 5 mm b/l  Neck: supple, no lymphadenopathy, no midline deviation, midline spine without stepoff   Heart: rrr, no m/r/g  Lungs: CTA b/l, no rales/ronchi/wheezes  Abd: soft, non-distended, no rebound or guarding  Ext: no clubbing/cyanosis/edema  Neuro: negative corneal/gag  Bedside FAST: +lung sliding b/l, no pericardial effusion, minimal cardiac squeeze, no free fluid in abd/gutters.  musculo: no gross bony deformities or evidence of superficial trauma

## 2023-06-01 NOTE — ED ADULT TRIAGE NOTE - CHIEF COMPLAINT QUOTE
pt BIB EMS with c/o SOB then arrested, EMS states patient verbalized falling down the stairs, was given 3 epi in the field, intubated, CODE BLUE CALLED , LUE I/O placed in the field  in the field

## 2023-06-01 NOTE — ED PROVIDER NOTE - OBJECTIVE STATEMENT
61 yo M bibems for cardiac arrest s/p fall down steps.  Arrest witnessed by EMS.  Pt. found at bottom of steps, verbal, then collapsed.  CPR started (15 minutes in field with epi given), intubated in field, confirmed in ER with VL.  Pt. cannot provide history.  CPR continued in ER for >20 minutes further, no ROSC, epi/bicarb/calcium given.  OF note, per EMS pt. was feeling weak all day, fever measured on rectal temp in ER  ROS: unobtainable   PMH: DM; Meds: unknown; SH: unknown

## 2023-06-01 NOTE — ED ADULT NURSE NOTE - OBJECTIVE STATEMENT
pt BIB EMS with c/o SOB then arrested, EMS states patient verbalized falling down the stairs, was given 3 epi in the field, intubated, CODE BLUE CALLED , LUE I/O placed in the field  in the field.

## 2023-06-06 LAB — GLUCOSE BLDC GLUCOMTR-MCNC: 382 MG/DL — HIGH (ref 70–99)

## 2023-10-26 ENCOUNTER — APPOINTMENT (OUTPATIENT)
Dept: CARDIOLOGY | Facility: CLINIC | Age: 63
End: 2023-10-26